# Patient Record
Sex: MALE | Race: WHITE | Employment: STUDENT | URBAN - METROPOLITAN AREA
[De-identification: names, ages, dates, MRNs, and addresses within clinical notes are randomized per-mention and may not be internally consistent; named-entity substitution may affect disease eponyms.]

---
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
## 2024-02-09 NOTE — PROGRESS NOTES
Follow up with your primary physician as scheduled. Follow up with your gynecologist.   Take one iron tablet twice daily to help improve the anemia.    Matt Franklin is a 14year old male who was brought in for this visit. History was provided by the parent  HPI:   Patient presents with:   Well Child      School performance and activities:thu hockey at [employer]    Diet: normal for age; no significan respiratory effort; lungs are clear to auscultation bilaterally   Cardiovascular: Rate and rhythm are regular with no murmurs, gallups, or rubs; normal radial and femoral pulses  Abdomen: Soft, non-tender, non-distended; no organomegaly noted; no masses  G

## (undated) NOTE — LETTER
VACCINE ADMINISTRATION RECORD  PARENT / GUARDIAN APPROVAL  Date: 3/28/2019  Vaccine administered to: Mariana Rodriguez     : 2000    MRN: [id number]    A copy of the appropriate Centers for Disease Control and Prevention Vaccine Information

## (undated) NOTE — LETTER
VACCINE ADMINISTRATION RECORD  PARENT / GUARDIAN APPROVAL  Date: 2020  Vaccine administered to: Harris Morgan     : 2000    MRN: [id number]    A copy of the appropriate Centers for Disease Control and Prevention Vaccine Information

## (undated) NOTE — Clinical Note
VACCINE ADMINISTRATION RECORD  PARENT / GUARDIAN APPROVAL  Date: 3/27/2017  Vaccine administered to: Palomo Lucero     : 2000    MRN: [id number]    A copy of the appropriate Centers for Disease Control and Prevention Vaccine Information

## (undated) NOTE — LETTER
[employer] [place] [employer] of Child Health Examination       Student's Name  [address] Signature                                                                                                                                   Title                           Date     Signature Male School   Grade Level/ID#      HEALTH HISTORY          TO BE COMPLETED AND SIGNED BY PARENT/GUARDIAN AND VERIFIED BY HEALTH CARE PROVIDER    ALLERGIES  (Food, drug, insect, other)  Patient has no known allergies.  MEDICATION  (List all prescribed or betito PHYSICAL EXAMINATION REQUIREMENTS (head circumference if <33 years old):   /70   Pulse 87   Ht 5' 8\" (1.727 m)   Wt 56.2 kg (124 lb)   BMI 18.85 kg/m²     DIABETES SCREENING  BMI>85% age/sex  No And any two of the following:  Family History No    E Respiratory Yes                   Diagnosis of Asthma: No Mental Health Yes        Currently Prescribed Asthma Medication:            Quick-relief  medication (e.g. Short Acting Beta Antagonist): No          Controller medication (e.g. inhaled corticostero

## (undated) NOTE — LETTER
[facility] [employer] of Child Health Examination       Student's Name  Kailey Cormier, Enid Guerrero Signature      ***   Title                           Date 6/17/2020     Signature                                                                                                                                              Title                           D HEALTH HISTORY          TO BE COMPLETED AND SIGNED BY PARENT/GUARDIAN AND VERIFIED BY HEALTH CARE PROVIDER    ALLERGIES  (Food, drug, insect, other)  Patient has no known allergies.  MEDICATION  (List all prescribed or taken on a regular basis.)  No current /70   Pulse 87   Ht 5' 8.75\" (1.746 m)   Wt 67.3 kg (148 lb 6 oz)   BMI 22.07 kg/m²     DIABETES SCREENING  BMI>85% age/sex  No And any two of the following:  Family History Yes    Ethnic Minority  No          Signs of Insulin Resistance (hypertensi Currently Prescribed Asthma Medication:            Quick-relief  medication (e.g. Short Acting Beta Antagonist): No          Controller medication (e.g. inhaled corticosteroid):   No Other   NEEDS/MODIFICATIONS required in the school setting  None DIET

## (undated) NOTE — LETTER
6/17/2020              [place] [id number]         To Whom It May Concern,    Immunization History   Administered Date(s) Administered   • DTAP 04/15/2002, 06/14/2002, 08/15/2002, 09/04/2003, 04/

## (undated) NOTE — LETTER
[facility] [employer] of Child Health Examination       Student's Name  Saadia Joshi verifying above immunization history must sign below.   Signature                                                                                                                                   Title       DO                    Date   3/28/2019   [place] Student's Name  Reinaldo Mandujano Birth Date  2/14/2000  Sex  Male School   Grade Level/ID#  11th Grade   HEALTH HISTORY          TO BE COMPLETED AND SIGNED BY PARENT/GUARDIAN AND VERIFIED BY HEALTH CARE PROVIDER    ALLERGIES  (Food, drug, insect, by MD/DO/APN/PA       PHYSICAL EXAMINATION REQUIREMENTS (head circumference if <33 years old):   /82   Pulse 96   Ht 5' 8.5\" (1.74 m)   Wt 61.2 kg (135 lb)   BMI 20.23 kg/m²     DIABETES SCREENING  BMI>85% age/sex  No And any two of the following: Cardiovascular/HTN Yes  Nutritional status Yes    Respiratory Yes                   Diagnosis of Asthma: No Mental Health Yes        Currently Prescribed Asthma Medication:            Quick-relief  medication (e.g. Short Acting Beta Antagonist):  No

## (undated) NOTE — LETTER
Name:  Marletta Dance Year:  12th Grade Class: Student ID No.:   Address:  [id number] [place] [address] Phone:  [phone] (home)  : 314/ 18year old   Name Relationship [address] Work Phone Home Phone Mobile Phone   1.  [address] [employer] [facility], 13. Does anyone in your family have a heart problem, pacemaker, or implanted defibrillator? 12. Has anyone in your family had unexplained fainting, seizures, or near drowning?      BONE AND JOINT QUESTIONS Yes No   17. Have you ever had an injury to a b after being hit or falling? 39.Have you ever been unable to move your arms / legs after being hit /fall? 40. Have you ever become ill while exercising in the heat?     41. Do you get frequent muscle cramps when exercising? 42.  Do you or someone · Murmurs (auscultation standing, supine, +/- Valsalva)  · Location of point of maximal impulse (PMI) Yes    Pulses Yes    Lungs Yes    Abdomen Yes    Genitourinary (males only)* Yes    Skin:  HSV, lesions suggestive of MRSA, tinea corporis Yes    Neurolog Protocol.  We have reviewed the policy and understand that I/our student may be asked to submit to testing for the presence of performance-enhancing substances in my/his/her body either during [employer] state series events or during the school day, and I/our kalani

## (undated) NOTE — Clinical Note
[facility] [employer] of [employer] of Child Health Examination       Student's Name  [address] Title                           Date    (If adding dates to the above immunization history section, put your initials by date(s) and sign here.)   ALTERNATIVE PROOF OF IMMUNITY   1 Diagnosis of asthma? Child wakes during the night coughing   Yes       No    Yes       No    Loss of function of one of paired organs? (eye/ear/kidney/testicle)   Yes       No      Birth Defects? Developmental delay?    Yes       No    Yes       No  Hospi Family History     Yes               Ethnic Minority         No                    Signs of Insulin Resistance (hypertension, dyslipidemia, polycystic ovarian syndrome, acanthosis nigricans)           No                At Risk  No   Lead Risk Questionnaire Controller medication (e.g. inhaled corticosteroid):   No Other   NEEDS/MODIFICATIONS required in the school setting  None DIETARY Needs/Restrictions     None   SPECIAL INSTRUCTIONS/DEVICES e.g. safety glasses, glass eye, chest protector for arrhyt

## (undated) NOTE — MR AVS SNAPSHOT
[address], [address] [facility]  [address]  [phone]               Thank you for choosing us for your health care visit with Vikash Lee. DO Aline.   We are glad to serve you and happy to provide you and authority? Does your child participate in family events, or does he or she withdraw from other family members? · Risky behaviors. Many teenagers are curious about drugs, alcohol, smoking, and sex. Talk openly about these issues.  Answer your child’s qu friend’s house counts as activity.    · Limit “screen time” to 1 hour to 2 hours each day. This includes time spent watching TV, playing video games, using the computer, and texting.  If your teen has a TV, computer, or video game console in the bedroom, co hard time falling asleep, which can lead to sleeping late the next morning. Here are some tips to help your teen get the rest he or she needs:  · Encourage your teen to keep a consistent bedtime, even on weekends.  Sleeping is easier when the body follows a gets a ticket or has an accident, there should be consequences. Driving is a privilege that can be taken away if your child doesn’t follow the rules. · Teach your child to make good decisions about drugs, alcohol, sex, and other risky behaviors.  Work toge substitute for professional medical care. Always follow your healthcare professional's instructions.           Wt Readings from Last 3 Encounters:  03/27/17 : 52.816 kg (116 lb 7 oz) (34 %*, Z = -0.42)  03/04/16 : 45.178 kg (99 lb 9.6 oz) (24 %*, Z = -0.69) 72-95 lbs               15 ml                        6                              3                       1&1/2             1  96 lbs and over     20 ml                                                        4                        2 pressure. All teens should get 1 hour of physical activity daily. School performance should be followed closely, and encourage your teen to begin planning for their college career/vocation.  Safety issues should include safe driving, avoiding cell phone use This content is reviewed periodically and is subject to change as new health information becomes available.  The information is intended to inform and educate and is not a replacement for medical evaluation, advice, diagnosis or treatment by a healthcare pr as they start crawling and walking. As your children grow, continue to help them live a healthy active lifestyle.     To lead a healthy active life, families can strive to reach these goals:  o 5 servings of fruits and vegetables a day  o 4 servings of wate